# Patient Record
Sex: MALE | Race: ASIAN | NOT HISPANIC OR LATINO
[De-identification: names, ages, dates, MRNs, and addresses within clinical notes are randomized per-mention and may not be internally consistent; named-entity substitution may affect disease eponyms.]

---

## 2023-07-11 PROBLEM — Z00.00 ENCOUNTER FOR PREVENTIVE HEALTH EXAMINATION: Status: ACTIVE | Noted: 2023-07-11

## 2023-07-28 ENCOUNTER — APPOINTMENT (OUTPATIENT)
Dept: PODIATRY | Facility: CLINIC | Age: 28
End: 2023-07-28
Payer: COMMERCIAL

## 2023-07-28 ENCOUNTER — NON-APPOINTMENT (OUTPATIENT)
Age: 28
End: 2023-07-28

## 2023-07-28 VITALS
HEIGHT: 69 IN | WEIGHT: 225 LBS | DIASTOLIC BLOOD PRESSURE: 82 MMHG | SYSTOLIC BLOOD PRESSURE: 120 MMHG | BODY MASS INDEX: 33.33 KG/M2

## 2023-07-28 DIAGNOSIS — Z78.9 OTHER SPECIFIED HEALTH STATUS: ICD-10-CM

## 2023-07-28 DIAGNOSIS — Z72.3 LACK OF PHYSICAL EXERCISE: ICD-10-CM

## 2023-07-28 DIAGNOSIS — L84 CORNS AND CALLOSITIES: ICD-10-CM

## 2023-07-28 DIAGNOSIS — I10 ESSENTIAL (PRIMARY) HYPERTENSION: ICD-10-CM

## 2023-07-28 PROCEDURE — 11730 AVULSION NAIL PLATE SIMPLE 1: CPT | Mod: RT

## 2023-07-28 PROCEDURE — 99203 OFFICE O/P NEW LOW 30 MIN: CPT | Mod: 25

## 2023-07-28 RX ORDER — PHENTERMINE HYDROCHLORIDE 15 MG/1
15 CAPSULE ORAL
Refills: 0 | Status: ACTIVE | COMMUNITY

## 2023-07-28 RX ORDER — VALSARTAN 40 MG/1
TABLET, COATED ORAL
Refills: 0 | Status: ACTIVE | COMMUNITY

## 2023-07-28 RX ORDER — CEPHALEXIN 500 MG/1
500 CAPSULE ORAL 3 TIMES DAILY
Qty: 21 | Refills: 1 | Status: ACTIVE | COMMUNITY
Start: 2023-07-28 | End: 1900-01-01

## 2023-07-28 RX ORDER — SILVER SULFADIAZINE 10 MG/G
1 CREAM TOPICAL TWICE DAILY
Qty: 1 | Refills: 1 | Status: ACTIVE | COMMUNITY
Start: 2023-07-28 | End: 1900-01-01

## 2023-07-28 NOTE — PHYSICAL EXAM
[General Appearance - Alert] : alert [General Appearance - In No Acute Distress] : in no acute distress [FreeTextEntry3] : Vascular exam reveals palpable pedal pulses, the foot is warm to touch, there was good capillary fill time, the skin is normal in appearance there is no evidence of vascular disease or compromise at this time [No Joint Swelling] : no joint swelling [] : normal strength/tone [Normal Foot/Ankle] : Both lower extremities were exposed and visualized. Standing exam demonstrates normal foot posture and alignment. Hindfoot exam shows no hindfoot valgus or varus [FreeTextEntry1] : right foot right GTN lateral: The evaluation of the area of cc reveals redness and pain from the tip of the nail back to the cuticle. Small area of drainage noted to the distal area of the nail. Also noted is the lack of cellulitis in the past DIP joint. The patient denies fever and chills denies groin pain and denies a history of chest pain. The diagnosis and clinical findings are consistent with a mildly infected paronychia\par  [Sensation] : the sensory exam was normal to light touch and pinprick [No Focal Deficits] : no focal deficits [Deep Tendon Reflexes (DTR)] : deep tendon reflexes were 2+ and symmetric [Motor Exam] : the motor exam was normal [Oriented To Time, Place, And Person] : oriented to person, place, and time [Impaired Insight] : insight and judgment were intact [Affect] : the affect was normal

## 2023-07-28 NOTE — REASON FOR VISIT
[Initial Visit] : an initial visit for [Ingrown Nail] : ingrown nail [Infection] : infection [FreeTextEntry2] : right great toenail

## 2023-07-28 NOTE — HISTORY OF PRESENT ILLNESS
[FreeTextEntry1] : Location: right lateral GTN\par Duration: about a month\par Etiology: improper cut, and anatomy\par Past Tx: self tx\par Exacerbated by: pressure\par Prior Hx: yes\par

## 2023-08-07 ENCOUNTER — APPOINTMENT (OUTPATIENT)
Dept: PODIATRY | Facility: CLINIC | Age: 28
End: 2023-08-07
Payer: COMMERCIAL

## 2023-08-07 VITALS — BODY MASS INDEX: 32.93 KG/M2 | DIASTOLIC BLOOD PRESSURE: 80 MMHG | SYSTOLIC BLOOD PRESSURE: 138 MMHG | WEIGHT: 223 LBS

## 2023-08-07 DIAGNOSIS — L03.032 CELLULITIS OF LEFT TOE: ICD-10-CM

## 2023-08-07 DIAGNOSIS — L03.031 CELLULITIS OF RIGHT TOE: ICD-10-CM

## 2023-08-07 PROCEDURE — 11750 EXCISION NAIL&NAIL MATRIX: CPT | Mod: TA

## 2023-08-07 PROCEDURE — 99213 OFFICE O/P EST LOW 20 MIN: CPT | Mod: 25

## 2023-08-07 NOTE — HISTORY OF PRESENT ILLNESS
[FreeTextEntry1] : Location: left lateral GTN Duration: > 1 year on and off Past Tx: self tx, professional tx Exacerbated by: pressure Prior Hx: yes, states he would like same Tx as right right lateral 2 weeks ago

## 2023-08-07 NOTE — PHYSICAL EXAM
[FreeTextEntry3] : Vascular exam reveals palpable pedal pulses, the foot is warm to touch, there was good capillary fill time, the skin is normal in appearance there is no evidence of vascular disease or compromise at this time [No Joint Swelling] : no joint swelling [] : normal strength/tone [Normal Foot/Ankle] : Both lower extremities were exposed and visualized. Standing exam demonstrates normal foot posture and alignment. Hindfoot exam shows no hindfoot valgus or varus [FreeTextEntry1] : Right lateral: no cellulitis, no tunneling, no sinus tract, no drainage, no odor, mild periwound erythema, no evidence of infection   left foot right GTN lateral: The evaluation of the area of cc reveals redness and pain from the tip of the nail back to the cuticle. Small area of drainage noted to the distal area of the nail. Also noted is the lack of cellulitis in the past DIP joint. The patient denies fever and chills denies groin pain and denies a history of chest pain. The diagnosis and clinical findings are consistent with a mildly infected paronychia

## 2023-08-07 NOTE — REASON FOR VISIT
[Follow-Up Visit] : a follow-up visit for [Ingrown Nail] : ingrown nail [Infection] : infection [FreeTextEntry2] : left great toenail. States right lateral completely healed

## 2023-08-07 NOTE — PROCEDURE
[FreeTextEntry1] : had a lengthy discussion with the patient regarding the diagnosis etiology and differential diagnosis as well as treatment options for the presenting problem. Risks alternatives and benefits of treatment ranging from conservative to surgical explained in great detail. I also explained the progression of treatment from conservative to possible surgical treatment options as well as the benefits of each. I do stress conservative treatment if in fact conservative treatment is an option until it no longer provides relief. Over-the-counter products medications padding, and splinting were reviewed as well. All questions asked and answered appropriately to the patient's satisfaction. left lateral GTN: After risks, alternatives and benefits were explained to the patient and verbal consent obtained, the surgical site on the affected toe was cleansed with alcohol, painted with Betadine and injected with 3 cc's of 1 percent lidocaine plain, Next using sterile instrumentation a partial nail plate avulsion was done to the affected border and next a phenol swab was inserted to the area for 60 seconds, The surgical site was then irrigated with copious amounts of alcohol and a dry sterile bandage was applied using Silvadene , The patient received written and oral discharge instructions as well as postoperative aftercare orders reviewed and understood. All questions asked and answered appropriately. follow up appt 2 weeks

## 2023-08-21 ENCOUNTER — APPOINTMENT (OUTPATIENT)
Dept: PODIATRY | Facility: CLINIC | Age: 28
End: 2023-08-21